# Patient Record
Sex: FEMALE | Race: WHITE | HISPANIC OR LATINO | Employment: OTHER | URBAN - METROPOLITAN AREA
[De-identification: names, ages, dates, MRNs, and addresses within clinical notes are randomized per-mention and may not be internally consistent; named-entity substitution may affect disease eponyms.]

---

## 2017-07-19 ENCOUNTER — HOSPITAL ENCOUNTER (OUTPATIENT)
Dept: RADIOLOGY | Facility: HOSPITAL | Age: 72
Discharge: HOME | End: 2017-07-19
Payer: MEDICARE

## 2017-07-19 DIAGNOSIS — M15.9 PRIMARY OSTEOARTHRITIS INVOLVING MULTIPLE JOINTS: ICD-10-CM

## 2017-07-19 PROCEDURE — 73560 X-RAY EXAM OF KNEE 1 OR 2: CPT | Mod: 26,RT,, | Performed by: RADIOLOGY

## 2017-07-19 PROCEDURE — 73120 X-RAY EXAM OF HAND: CPT | Mod: 26,LT,, | Performed by: RADIOLOGY

## 2017-07-19 PROCEDURE — 73560 X-RAY EXAM OF KNEE 1 OR 2: CPT | Mod: TC,LT

## 2017-07-19 PROCEDURE — 73120 X-RAY EXAM OF HAND: CPT | Mod: 26,RT,, | Performed by: RADIOLOGY

## 2017-07-19 PROCEDURE — 73560 X-RAY EXAM OF KNEE 1 OR 2: CPT | Mod: TC,RT

## 2017-07-19 PROCEDURE — 73560 X-RAY EXAM OF KNEE 1 OR 2: CPT | Mod: 26,LT,, | Performed by: RADIOLOGY

## 2017-07-19 PROCEDURE — 73120 X-RAY EXAM OF HAND: CPT | Mod: TC,50,LT

## 2017-08-01 ENCOUNTER — HOSPITAL ENCOUNTER (EMERGENCY)
Facility: HOSPITAL | Age: 72
Discharge: HOME | End: 2017-08-01
Attending: EMERGENCY MEDICINE
Payer: MEDICARE

## 2017-08-01 VITALS
RESPIRATION RATE: 18 BRPM | DIASTOLIC BLOOD PRESSURE: 59 MMHG | HEART RATE: 80 BPM | BODY MASS INDEX: 34.09 KG/M2 | OXYGEN SATURATION: 97 % | TEMPERATURE: 98 F | HEIGHT: 57 IN | WEIGHT: 158 LBS | SYSTOLIC BLOOD PRESSURE: 115 MMHG

## 2017-08-01 DIAGNOSIS — R42 DIZZINESS: Primary | ICD-10-CM

## 2017-08-01 LAB
ALBUMIN SERPL BCP-MCNC: 3.3 G/DL
ALP SERPL-CCNC: 147 U/L
ALT SERPL W/O P-5'-P-CCNC: 14 U/L
ANION GAP SERPL CALC-SCNC: 9 MMOL/L
AST SERPL-CCNC: 38 U/L
BASOPHILS # BLD AUTO: 0.04 K/UL
BASOPHILS NFR BLD: 0.5 %
BILIRUB SERPL-MCNC: 0.3 MG/DL
BILIRUB UR QL STRIP: NEGATIVE
BNP SERPL-MCNC: 29 PG/ML
BUN SERPL-MCNC: 15 MG/DL
CALCIUM SERPL-MCNC: 9.5 MG/DL
CHLORIDE SERPL-SCNC: 110 MMOL/L
CLARITY UR: CLEAR
CO2 SERPL-SCNC: 22 MMOL/L
COLOR UR: NORMAL
CREAT SERPL-MCNC: 1 MG/DL
DIFFERENTIAL METHOD: ABNORMAL
EOSINOPHIL # BLD AUTO: 0 K/UL
EOSINOPHIL NFR BLD: 0.5 %
ERYTHROCYTE [DISTWIDTH] IN BLOOD BY AUTOMATED COUNT: 15.1 %
EST. GFR  (AFRICAN AMERICAN): >60 ML/MIN/1.73 M^2
EST. GFR  (NON AFRICAN AMERICAN): 56 ML/MIN/1.73 M^2
GLUCOSE SERPL-MCNC: 119 MG/DL
GLUCOSE UR QL STRIP: NEGATIVE
HCT VFR BLD AUTO: 34.8 %
HGB BLD-MCNC: 10.4 G/DL
HGB UR QL STRIP: NEGATIVE
KETONES UR QL STRIP: NEGATIVE
LEUKOCYTE ESTERASE UR QL STRIP: NEGATIVE
LIPASE SERPL-CCNC: 44 U/L
LYMPHOCYTES # BLD AUTO: 0.8 K/UL
LYMPHOCYTES NFR BLD: 10.1 %
MCH RBC QN AUTO: 24.2 PG
MCHC RBC AUTO-ENTMCNC: 29.9 G/DL
MCV RBC AUTO: 81 FL
MONOCYTES # BLD AUTO: 0.9 K/UL
MONOCYTES NFR BLD: 11.1 %
NEUTROPHILS # BLD AUTO: 5.9 K/UL
NEUTROPHILS NFR BLD: 77.8 %
NITRITE UR QL STRIP: NEGATIVE
PH UR STRIP: 6 [PH] (ref 5–8)
PLATELET # BLD AUTO: 348 K/UL
PMV BLD AUTO: 9 FL
POTASSIUM SERPL-SCNC: 4.5 MMOL/L
PROT SERPL-MCNC: 7.3 G/DL
PROT UR QL STRIP: NEGATIVE
RBC # BLD AUTO: 4.3 M/UL
SODIUM SERPL-SCNC: 141 MMOL/L
SP GR UR STRIP: 1 (ref 1–1.03)
TROPONIN I SERPL DL<=0.01 NG/ML-MCNC: <0.006 NG/ML
URN SPEC COLLECT METH UR: NORMAL
UROBILINOGEN UR STRIP-ACNC: NEGATIVE EU/DL
WBC # BLD AUTO: 7.64 K/UL

## 2017-08-01 PROCEDURE — 83690 ASSAY OF LIPASE: CPT

## 2017-08-01 PROCEDURE — 80053 COMPREHEN METABOLIC PANEL: CPT

## 2017-08-01 PROCEDURE — 99285 EMERGENCY DEPT VISIT HI MDM: CPT

## 2017-08-01 PROCEDURE — 84484 ASSAY OF TROPONIN QUANT: CPT

## 2017-08-01 PROCEDURE — 87086 URINE CULTURE/COLONY COUNT: CPT

## 2017-08-01 PROCEDURE — 81003 URINALYSIS AUTO W/O SCOPE: CPT

## 2017-08-01 PROCEDURE — 83880 ASSAY OF NATRIURETIC PEPTIDE: CPT

## 2017-08-01 PROCEDURE — 93005 ELECTROCARDIOGRAM TRACING: CPT

## 2017-08-01 PROCEDURE — 85025 COMPLETE CBC W/AUTO DIFF WBC: CPT

## 2017-08-01 RX ORDER — MECLIZINE HCL 12.5 MG 12.5 MG/1
12.5 TABLET ORAL 3 TIMES DAILY PRN
Qty: 12 TABLET | Refills: 0 | Status: SHIPPED | OUTPATIENT
Start: 2017-08-01

## 2017-08-01 RX ORDER — ONDANSETRON 4 MG/1
4 TABLET, ORALLY DISINTEGRATING ORAL EVERY 8 HOURS PRN
Qty: 10 TABLET | Refills: 0 | Status: SHIPPED | OUTPATIENT
Start: 2017-08-01

## 2017-08-01 NOTE — ED TRIAGE NOTES
"Pt arrived via personal transportation from home. CC of dizziness. Pt stated "The past four days I have been feeling like my head is just spinning." Pt presents with dizziness, N/V x1 episode this morning. Pt denies SOB/CP. NAD at this time.  "

## 2017-08-01 NOTE — ED PROVIDER NOTES
Encounter Date: 8/1/2017       History     Chief Complaint   Patient presents with    Dizziness     dizziness since Friday (4 days).  nausea and vomiting.  denies fever or diarrhea.  no other complaints.     72-year-old female with a history of depression, dyslipidemia, GERD, and hypertension along with a past surgical history includes cholecystectomy who presents to the emergency department complaining intermittent dizziness with associated nausea and vomiting the past 4 days.  Symptoms are worse with positional changes such as sitting to standing.  No known alleviating factors.  She denies any specific pain including headache, chest or abdomen.  She also denies any reported fever, source of breath, diaphoresis, diarrhea and/or constipation.  No recent travel, hospitalizations or sick contacts.          The history is provided by the patient.     Review of patient's allergies indicates:   Allergen Reactions    Tramadol Itching     Past Medical History:   Diagnosis Date    Deficient knowledge of degenerative joint disease (DJD) home care     Depression     Dyslipidemia     Episodic tobacco abuse     Esophageal reflux     Hypercholesterolemia     Osteopenia     Unspecified essential hypertension      Past Surgical History:   Procedure Laterality Date    CHOLECYSTECTOMY       Family History   Problem Relation Age of Onset    Stroke Father     Hypertension Father     Alzheimer's disease Mother     Diabetes Sister     Heart attack Neg Hx     Heart disease Neg Hx     Breast cancer Neg Hx     Colon cancer Neg Hx     Ovarian cancer Neg Hx      Social History   Substance Use Topics    Smoking status: Never Smoker    Smokeless tobacco: Never Used    Alcohol use No      Comment: occasionally     Review of Systems  General:   (-) fever,  (-) fatigue  ENT:  (-) sore throat, (-) difficulty swallowing    CV:   (-) chest pain,  (-) Dyspnea on exertion  Respiratory:   (-) SOB,  (-) cough, (-)  wheezing  Gastrointestinal:  (-) nausea, (-) vomiting, (-) Abdominal pain, (-) diarrhea, (-) constipation  Genitourinary:  (-) dysuria, (-) frequency, (-) hematuria  Skin:  (-) Rash,  (-) lesion(s)  Musculoskeletal:  (-) neck pain (-) back pain, (-) myalgia, (-) joint pain, (-) joint swelling  Neuro:  (-) Dizziness, (-) Headache, (-) dizziness, (-) focal weakness  Heme/Lymph:  (-) Bleeding.  (-) lymphadenopathy  All other systems negative.    Physical Exam     Initial Vitals [08/01/17 1103]   BP Pulse Resp Temp SpO2   (!) 120/59 95 17 97.9 °F (36.6 °C) 97 %      MAP       79.33         Physical Exam  Nursing note and vitals reviewed.  Constitutional: Uncomfortable appearing female in no obvious distress  HENT:    Head: NC/AT    Eyes: Conjunctivae normal.  (-) scleral icterus.              Mouth/Throat: MMM   Neck: Neck supple, normal rom.  Cardiovascular: RRR   Pulmonary/Chest: CTAB   Abdomen:  Soft, ND/Mild-to-moderate suprapubic TTP w/o guarding or rebound.  (+) BS.  No palpable pulsatile mass.  (-) CVA tenderness.  Musculoskeletal:  FROM of all major joints. No apparent edema or tenderness.  Neurological: A&O x4.  No focal motor deficits.  Normal gait.  Skin: Skin is intact, warm and dry.  No rash.  Psychiatric: normal mood and affect.      ED Course   Procedures  Labs Reviewed   CBC W/ AUTO DIFFERENTIAL - Abnormal; Notable for the following:        Result Value    Hemoglobin 10.4 (*)     Hematocrit 34.8 (*)     MCV 81 (*)     MCH 24.2 (*)     MCHC 29.9 (*)     RDW 15.1 (*)     MPV 9.0 (*)     Lymph # 0.8 (*)     Gran% 77.8 (*)     Lymph% 10.1 (*)     All other components within normal limits   COMPREHENSIVE METABOLIC PANEL - Abnormal; Notable for the following:     CO2 22 (*)     Glucose 119 (*)     Albumin 3.3 (*)     Alkaline Phosphatase 147 (*)     eGFR if non  56 (*)     All other components within normal limits   CULTURE, URINE   TROPONIN I   B-TYPE NATRIURETIC PEPTIDE   URINALYSIS   LIPASE      EKG Readings: (Independently Interpreted)   Initial Reading: No STEMI.   Nsr, rate 89, normal axis/intervals, no ST/T wave abn.         X-Rays:   Independently Interpreted Readings:   Chest X-Ray: Normal heart size.  No infiltrates.  No acute abnormalities.   Head CT: No hemorrhage.  No skull fracture.  No acute stroke.     Medical Decision Making:   History:   I obtained history from: someone other than patient.  Old Medical Records: I decided to obtain old medical records.  Old Records Summarized: records from clinic visits and other records.  Independently Interpreted Test(s):   I have ordered and independently interpreted X-rays - see prior notes.  I have ordered and independently interpreted EKG Reading(s) - see prior notes  Clinical Tests:   Lab Tests: Ordered and Reviewed  Radiological Study: Ordered and Reviewed  Medical Tests: Ordered and Reviewed    Additional Medical Decision Makin-year-old female with history of dyslipidemia, hypertension and GERD presents to the emergency department complaining of intermittent dizziness with associated nausea and vomiting for the past 4 days - see HPI for additional details.  On exam, she appears well in no obvious distress or discomfort.  Her abdomen is soft and nontender.  She is A&Ox4 - neuro exam without focal motor and/or sensory deficits.  Vital signs significant orthostatic hypotension.  Basic labs within normal limits.  Screening EKG without evidence of acute ischemia or dysrhythmia - troponin negative.  BNP 29.  Urinalysis normal (culture pending).  After having received IV fluids and antiemetics, she reports marked improvement.  Vital signs had normalized.  Findings at this time are most consistent with gastritis, likely viral.  Dizziness likely secondary to dehydration.  Recommend supportive care including antiemetics as needed.  She's been strongly encouraged follow-up with the patient days for reevaluation and management.  Return instructions  discussed prior to discharge.                   ED Course      Clinical Impression:   The encounter diagnosis was Dizziness.    Disposition:   Disposition: Discharged                        Marv Napier MD  09/05/17 [id number]

## 2017-08-03 LAB — BACTERIA UR CULT: NORMAL
